# Patient Record
Sex: MALE | Race: BLACK OR AFRICAN AMERICAN | NOT HISPANIC OR LATINO | Employment: FULL TIME | URBAN - METROPOLITAN AREA
[De-identification: names, ages, dates, MRNs, and addresses within clinical notes are randomized per-mention and may not be internally consistent; named-entity substitution may affect disease eponyms.]

---

## 2022-07-05 ENCOUNTER — OFFICE VISIT (OUTPATIENT)
Dept: ORTHOPEDICS | Facility: CLINIC | Age: 52
End: 2022-07-05
Payer: MEDICAID

## 2022-07-05 ENCOUNTER — HOSPITAL ENCOUNTER (OUTPATIENT)
Dept: RADIOLOGY | Facility: HOSPITAL | Age: 52
Discharge: HOME | End: 2022-07-05
Attending: STUDENT IN AN ORGANIZED HEALTH CARE EDUCATION/TRAINING PROGRAM
Payer: MEDICAID

## 2022-07-05 VITALS
DIASTOLIC BLOOD PRESSURE: 88 MMHG | SYSTOLIC BLOOD PRESSURE: 162 MMHG | RESPIRATION RATE: 18 BRPM | BODY MASS INDEX: 28.23 KG/M2 | HEART RATE: 71 BPM | HEIGHT: 71 IN | WEIGHT: 201.63 LBS

## 2022-07-05 DIAGNOSIS — G89.29 CHRONIC RIGHT SHOULDER PAIN: ICD-10-CM

## 2022-07-05 DIAGNOSIS — M25.511 CHRONIC RIGHT SHOULDER PAIN: Primary | ICD-10-CM

## 2022-07-05 DIAGNOSIS — M25.511 CHRONIC RIGHT SHOULDER PAIN: ICD-10-CM

## 2022-07-05 DIAGNOSIS — G89.29 CHRONIC RIGHT SHOULDER PAIN: Primary | ICD-10-CM

## 2022-07-05 DIAGNOSIS — M19.011 ARTHRITIS OF RIGHT ACROMIOCLAVICULAR JOINT: ICD-10-CM

## 2022-07-05 DIAGNOSIS — M75.101 ROTATOR CUFF SYNDROME OF RIGHT SHOULDER: ICD-10-CM

## 2022-07-05 PROCEDURE — 99204 OFFICE O/P NEW MOD 45 MIN: CPT | Mod: 25,S$PBB,, | Performed by: STUDENT IN AN ORGANIZED HEALTH CARE EDUCATION/TRAINING PROGRAM

## 2022-07-05 PROCEDURE — 4010F PR ACE/ARB THEARPY RXD/TAKEN: ICD-10-PCS | Mod: CPTII,,, | Performed by: STUDENT IN AN ORGANIZED HEALTH CARE EDUCATION/TRAINING PROGRAM

## 2022-07-05 PROCEDURE — 1159F PR MEDICATION LIST DOCUMENTED IN MEDICAL RECORD: ICD-10-PCS | Mod: CPTII,,, | Performed by: STUDENT IN AN ORGANIZED HEALTH CARE EDUCATION/TRAINING PROGRAM

## 2022-07-05 PROCEDURE — 3077F PR MOST RECENT SYSTOLIC BLOOD PRESSURE >= 140 MM HG: ICD-10-PCS | Mod: CPTII,,, | Performed by: STUDENT IN AN ORGANIZED HEALTH CARE EDUCATION/TRAINING PROGRAM

## 2022-07-05 PROCEDURE — 99204 PR OFFICE/OUTPT VISIT, NEW, LEVL IV, 45-59 MIN: ICD-10-PCS | Mod: 25,S$PBB,, | Performed by: STUDENT IN AN ORGANIZED HEALTH CARE EDUCATION/TRAINING PROGRAM

## 2022-07-05 PROCEDURE — 1160F RVW MEDS BY RX/DR IN RCRD: CPT | Mod: CPTII,,, | Performed by: STUDENT IN AN ORGANIZED HEALTH CARE EDUCATION/TRAINING PROGRAM

## 2022-07-05 PROCEDURE — 4010F ACE/ARB THERAPY RXD/TAKEN: CPT | Mod: CPTII,,, | Performed by: STUDENT IN AN ORGANIZED HEALTH CARE EDUCATION/TRAINING PROGRAM

## 2022-07-05 PROCEDURE — 3008F BODY MASS INDEX DOCD: CPT | Mod: CPTII,,, | Performed by: STUDENT IN AN ORGANIZED HEALTH CARE EDUCATION/TRAINING PROGRAM

## 2022-07-05 PROCEDURE — 73030 X-RAY EXAM OF SHOULDER: CPT | Mod: TC,RT

## 2022-07-05 PROCEDURE — 20610 LARGE JOINT ASPIRATION/INJECTION: R SUBACROMIAL BURSA: ICD-10-PCS | Mod: S$PBB,RT,, | Performed by: STUDENT IN AN ORGANIZED HEALTH CARE EDUCATION/TRAINING PROGRAM

## 2022-07-05 PROCEDURE — 3079F DIAST BP 80-89 MM HG: CPT | Mod: CPTII,,, | Performed by: STUDENT IN AN ORGANIZED HEALTH CARE EDUCATION/TRAINING PROGRAM

## 2022-07-05 PROCEDURE — 99204 OFFICE O/P NEW MOD 45 MIN: CPT | Mod: PBBFAC,25 | Performed by: STUDENT IN AN ORGANIZED HEALTH CARE EDUCATION/TRAINING PROGRAM

## 2022-07-05 PROCEDURE — 3077F SYST BP >= 140 MM HG: CPT | Mod: CPTII,,, | Performed by: STUDENT IN AN ORGANIZED HEALTH CARE EDUCATION/TRAINING PROGRAM

## 2022-07-05 PROCEDURE — 3008F PR BODY MASS INDEX (BMI) DOCUMENTED: ICD-10-PCS | Mod: CPTII,,, | Performed by: STUDENT IN AN ORGANIZED HEALTH CARE EDUCATION/TRAINING PROGRAM

## 2022-07-05 PROCEDURE — 3079F PR MOST RECENT DIASTOLIC BLOOD PRESSURE 80-89 MM HG: ICD-10-PCS | Mod: CPTII,,, | Performed by: STUDENT IN AN ORGANIZED HEALTH CARE EDUCATION/TRAINING PROGRAM

## 2022-07-05 PROCEDURE — 1159F MED LIST DOCD IN RCRD: CPT | Mod: CPTII,,, | Performed by: STUDENT IN AN ORGANIZED HEALTH CARE EDUCATION/TRAINING PROGRAM

## 2022-07-05 PROCEDURE — 1160F PR REVIEW ALL MEDS BY PRESCRIBER/CLIN PHARMACIST DOCUMENTED: ICD-10-PCS | Mod: CPTII,,, | Performed by: STUDENT IN AN ORGANIZED HEALTH CARE EDUCATION/TRAINING PROGRAM

## 2022-07-05 PROCEDURE — 20610 DRAIN/INJ JOINT/BURSA W/O US: CPT | Mod: PBBFAC,RT | Performed by: STUDENT IN AN ORGANIZED HEALTH CARE EDUCATION/TRAINING PROGRAM

## 2022-07-05 RX ORDER — LISINOPRIL 40 MG/1
TABLET ORAL
COMMUNITY
Start: 2022-05-10

## 2022-07-05 RX ORDER — ISOSORBIDE MONONITRATE 60 MG/1
TABLET, EXTENDED RELEASE ORAL
COMMUNITY
Start: 2022-05-10

## 2022-07-05 RX ORDER — TRIAMCINOLONE ACETONIDE 40 MG/ML
40 INJECTION, SUSPENSION INTRA-ARTICULAR; INTRAMUSCULAR
Status: DISCONTINUED | OUTPATIENT
Start: 2022-07-05 | End: 2022-07-05 | Stop reason: HOSPADM

## 2022-07-05 RX ORDER — LIDOCAINE HYDROCHLORIDE 10 MG/ML
5 INJECTION, SOLUTION EPIDURAL; INFILTRATION; INTRACAUDAL; PERINEURAL
Status: DISCONTINUED | OUTPATIENT
Start: 2022-07-05 | End: 2022-07-05 | Stop reason: HOSPADM

## 2022-07-05 RX ORDER — CYCLOBENZAPRINE HCL 10 MG
TABLET ORAL
COMMUNITY
Start: 2022-05-10

## 2022-07-05 RX ORDER — AMLODIPINE BESYLATE 10 MG/1
TABLET ORAL
COMMUNITY
Start: 2022-05-10

## 2022-07-05 RX ORDER — METHOCARBAMOL 750 MG/1
TABLET, FILM COATED ORAL
COMMUNITY
Start: 2022-05-10

## 2022-07-05 RX ORDER — METFORMIN HYDROCHLORIDE 500 MG/1
500 TABLET ORAL
COMMUNITY

## 2022-07-05 RX ORDER — FLUOXETINE HYDROCHLORIDE 20 MG/1
CAPSULE ORAL
COMMUNITY
Start: 2022-05-10

## 2022-07-05 RX ORDER — GABAPENTIN 300 MG/1
CAPSULE ORAL
COMMUNITY
Start: 2022-05-10

## 2022-07-05 RX ADMIN — LIDOCAINE HYDROCHLORIDE 5 ML: 10 INJECTION, SOLUTION EPIDURAL; INFILTRATION; INTRACAUDAL; PERINEURAL at 09:07

## 2022-07-05 RX ADMIN — TRIAMCINOLONE ACETONIDE 40 MG: 40 INJECTION, SUSPENSION INTRA-ARTICULAR; INTRAMUSCULAR at 09:07

## 2022-07-05 NOTE — PROGRESS NOTES
"  Subjective:       New pt   Patient ID: Bubba Montgomery is a Right dominate 51 y.o. male. Non-smoker. 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          Chief Complaint: Pain of the Right Shoulder    HPI:    Right aching, stabbing and throbbing deep shoulder pain.   Injury: 2 yrs ago pt fell off machinery at work  Onset: 2 years ago worsening over time  Pain level: 7 /10  Modifying Factors: increased with overhead movement and increased pain at PM  Associated Symptoms: popping, numbness and difficulty sleeping s/t pain  Activity: pain moderately interferes with ADLs .   Previous Treatments: RX NSAIDs and RX PT completed 12 wks/ 3 xs per week d/c'd 3mo ago without significant relief.        Current Outpatient Medications:     amLODIPine (NORVASC) 10 MG tablet, , Disp: , Rfl:     cyclobenzaprine (FLEXERIL) 10 MG tablet, , Disp: , Rfl:     erythromycin (ROMYCIN) ophthalmic ointment, Place a 1/2 inch ribbon of ointment into the left lower eyelid., Disp: 1 Tube, Rfl: 0    FLUoxetine 20 MG capsule, , Disp: , Rfl:     gabapentin (NEURONTIN) 300 MG capsule, , Disp: , Rfl:     isosorbide mononitrate (IMDUR) 60 MG 24 hr tablet, , Disp: , Rfl:     lisinopriL (PRINIVIL,ZESTRIL) 40 MG tablet, , Disp: , Rfl:     metFORMIN (GLUCOPHAGE) 500 MG tablet, Take 500 mg by mouth daily with breakfast., Disp: , Rfl:     methocarbamoL (ROBAXIN) 750 MG Tab, , Disp: , Rfl:     Review of patient's allergies indicates:   Allergen Reactions    Penicillins Rash       No results found for: LABA1C   Body mass index is 28.12 kg/m².   Vitals:    07/05/22 1025   BP: (!) 162/88   Pulse: 71   Resp: 18   Weight: 91.4 kg (201 lb 9.6 oz)   Height: 5' 11" (1.803 m)   PainSc:   7        ROS   Review of Systems:  A ten-point review of systems was performed and is negative, except as mentioned above.        Objective:      General: well developed; well nourished; cooperative  PSYCH: alert and oriented with appropriate mood and " affect  SKIN: inspection and palpation of skin and soft tissue normal;  CV: vascular integrity noted; +2 symmetrical pulses  Resp: Normal work of breathing, quiet breathing    Shoulder: right  Inspection:   No swelling, erythema or skin breakdown.  No atrophy or asymmetry.  Palpation:    No Spasm, Crepitus.  Tenderness to Palpation (Bold if positive)     AC joint, supraspinatus, infraspinatus, biceps tendon    Active ROM:  Flexion:restricted to 120 degrees  Extension:unrestricted   Abduction:restricted to ~110 degrees  External rot:unrestricted  Internal rot:unrestricted  Passive ROM:   As above    SPECIAL TESTS:  Rotator Cuff:         - Empty can: positive      - Subscap push-off:  positive  AC joint:   - AC compression: +   - Crossed arm abd:negative  Impingement tests:        - Neer (I): positive     - Hawkins (II): positive   Instability tests:   - Apprehension: negative   - Sulcus sign: negative  Labral tests:         - Ocharlys:negative      - Axial Load & Grind:negative   - Bicep Load Test: negative                     Neurovascular exam:   Dermatomes: C5-T1: intact to light touch   Brachioradialis reflex: normal   Triceps  reflex:  normal   Radial pulse: Normal         Imaging:  X-ray reviewed and independently interpreted by me.  Discussed with patient.   As per my interpretation the patient's right shoulder plain film.  GH joint is slightly narrowed with sclerosis of the glenoid, AC joint is narrow with diffuse osteophyte present, rotator cuff interval is preserved.  No fractures or dislocations.        Assessment:               1. Chronic right shoulder pain    2. Rotator cuff syndrome of right shoulder    3. Arthritis of right acromioclavicular joint          Plan:       Orders Placed This Encounter    Large Joint Aspiration/Injection: R subacromial bursa    X-ray Shoulder 2 or More Views Right     51 y.o.  patient presenting for evaluation of Pain of the Right Shoulder   secondary to rotator cuff  syndrome with concurrent AC joint arthritis.     moderately exacerbated.   Radiological studies ordered and interpreted by me, my independent interpretation attached, reviewed my findings with patient and showed them their images  CSI performed today to R SASD region, consented, site marked, time out performed, tolerated well, NVI following injection;  Activity as tolerated, behavior modification encouraged  Completed PT, Encouraged HEP daily,  Ice/Heat prn, NSAIDs/Tylenol/topical anasthetics PRN, med precautions given,   Follow up 7 weeks .  May consider CSI to the AC joint verses shoulder MRI to evaluate for internal derangement at next visit.      Camila Marley MD    Large Joint Aspiration/Injection: R subacromial bursa    Date/Time: 7/5/2022 9:30 AM  Performed by: Camila Marley MD  Authorized by: Camila Marley MD     Consent Done?:  Yes (Written)  Indications:  Pain  Site marked: the procedure site was marked    Timeout: prior to procedure the correct patient, procedure, and site was verified    Prep: patient was prepped and draped in usual sterile fashion      Local anesthesia used?: Yes    Local anesthetic:  Topical anesthetic    Details:  Needle Size:  21 G  Ultrasonic Guidance for needle placement?: No    Approach:  Anterolateral  Location:  Shoulder  Site:  R subacromial bursa  Medications:  5 mL LIDOcaine (PF) 10 mg/ml (1%) 10 mg/mL (1 %); 40 mg triamcinolone acetonide 40 mg/mL  Patient tolerance:  Patient tolerated the procedure well with no immediate complications     Procedure: 5 mL of 1% lidocaine plain with 40 mg of Kenalog injected into each location.    RISKS: Possible complications with injection include bleeding, infection (0.01%), pain, allergic reaction to medicine or preservatives within the medicine    Post Procedure: Patient alert, moving all extremities. Good peripheral pulses, no signs of vascular compromise, range of motion intact. Patient tolerated procedure well. Aftercare  instructions were given to patient at time of discharge. Relative rest for 3 days - avoiding excessive activity. Place ice on area for 15 minutes every 4 to 6 hours. Tylenol 1000mg twice a day or ibuprofen 600 mg three times per day for next 3-4 days if not on medication already. Avoid excessive activity for next 3 to 4 weeks. ER precautions for fever, severe joint pain, or allergic reaction or other new symptoms related to joint injection.